# Patient Record
Sex: MALE | Employment: FULL TIME | URBAN - METROPOLITAN AREA
[De-identification: names, ages, dates, MRNs, and addresses within clinical notes are randomized per-mention and may not be internally consistent; named-entity substitution may affect disease eponyms.]

---

## 2019-02-06 ENCOUNTER — OFFICE VISIT (OUTPATIENT)
Dept: INTERNAL MEDICINE CLINIC | Facility: CLINIC | Age: 44
End: 2019-02-06

## 2019-02-06 VITALS
TEMPERATURE: 98 F | BODY MASS INDEX: 38.89 KG/M2 | HEART RATE: 68 BPM | DIASTOLIC BLOOD PRESSURE: 84 MMHG | HEIGHT: 67 IN | WEIGHT: 247.81 LBS | RESPIRATION RATE: 20 BRPM | SYSTOLIC BLOOD PRESSURE: 134 MMHG

## 2019-02-06 DIAGNOSIS — E66.9 OBESITY (BMI 30-39.9): ICD-10-CM

## 2019-02-06 DIAGNOSIS — R06.83 SNORING: ICD-10-CM

## 2019-02-06 DIAGNOSIS — R53.83 OTHER FATIGUE: ICD-10-CM

## 2019-02-06 DIAGNOSIS — Z00.00 PHYSICAL EXAM: Primary | ICD-10-CM

## 2019-02-06 DIAGNOSIS — Z00.00 PE (PHYSICAL EXAM), ROUTINE: ICD-10-CM

## 2019-02-06 PROCEDURE — 99213 OFFICE O/P EST LOW 20 MIN: CPT | Performed by: INTERNAL MEDICINE

## 2019-02-06 PROCEDURE — 99212 OFFICE O/P EST SF 10 MIN: CPT | Performed by: INTERNAL MEDICINE

## 2019-02-06 PROCEDURE — 99386 PREV VISIT NEW AGE 40-64: CPT | Performed by: INTERNAL MEDICINE

## 2019-02-06 NOTE — PROGRESS NOTES
HPI:    Patient ID: Derek Veronica is a 39year old male.   Patient presents with:  Physical: Pt is presenting today as a new pt and for a physical    Patient presents today for physical exam, states doing well otherwise , denies chest pain, shortness of file.  Allergies:No Known Allergies   PHYSICAL EXAM:   Physical Exam   Constitutional: He is oriented to person, place, and time. He appears well-developed and well-nourished. No distress. Obese    HENT:   Head: Normocephalic and atraumatic.    Right Ear: tolerated   Complete labs as ordered, preventative health maintenance testing discussed,   Immunizations discussed with patient   Patient verbalized understanding of all instructions and plan of care      Other fatigue /loud Snoring obesity  Pt  jessi miller

## 2019-03-07 ENCOUNTER — LAB ENCOUNTER (OUTPATIENT)
Dept: LAB | Age: 44
End: 2019-03-07
Attending: INTERNAL MEDICINE
Payer: COMMERCIAL

## 2019-03-07 DIAGNOSIS — Z00.00 PHYSICAL EXAM: ICD-10-CM

## 2019-03-07 LAB
ALBUMIN SERPL-MCNC: 3.9 G/DL (ref 3.4–5)
ALBUMIN/GLOB SERPL: 1 {RATIO} (ref 1–2)
ALP LIVER SERPL-CCNC: 56 U/L (ref 45–117)
ALT SERPL-CCNC: 36 U/L (ref 16–61)
ANION GAP SERPL CALC-SCNC: 6 MMOL/L (ref 0–18)
AST SERPL-CCNC: 21 U/L (ref 15–37)
BASOPHILS # BLD AUTO: 0.03 X10(3) UL (ref 0–0.2)
BASOPHILS NFR BLD AUTO: 0.7 %
BILIRUB SERPL-MCNC: 0.9 MG/DL (ref 0.1–2)
BILIRUB UR QL: NEGATIVE
BUN BLD-MCNC: 9 MG/DL (ref 7–18)
BUN/CREAT SERPL: 10.2 (ref 10–20)
CALCIUM BLD-MCNC: 9 MG/DL (ref 8.5–10.1)
CHLORIDE SERPL-SCNC: 102 MMOL/L (ref 98–107)
CHOLEST SMN-MCNC: 238 MG/DL (ref ?–200)
CLARITY UR: CLEAR
CO2 SERPL-SCNC: 30 MMOL/L (ref 21–32)
COLOR UR: YELLOW
COMPLEXED PSA SERPL-MCNC: 0.29 NG/ML (ref ?–4)
CREAT BLD-MCNC: 0.88 MG/DL (ref 0.7–1.3)
DEPRECATED RDW RBC AUTO: 43.1 FL (ref 35.1–46.3)
EOSINOPHIL # BLD AUTO: 0.13 X10(3) UL (ref 0–0.7)
EOSINOPHIL NFR BLD AUTO: 3.1 %
ERYTHROCYTE [DISTWIDTH] IN BLOOD BY AUTOMATED COUNT: 14.2 % (ref 11–15)
GLOBULIN PLAS-MCNC: 4 G/DL (ref 2.8–4.4)
GLUCOSE BLD-MCNC: 104 MG/DL (ref 70–99)
GLUCOSE UR-MCNC: NEGATIVE MG/DL
HCT VFR BLD AUTO: 48.5 % (ref 39–53)
HDLC SERPL-MCNC: 47 MG/DL (ref 40–59)
HGB BLD-MCNC: 15.1 G/DL (ref 13–17.5)
HGB UR QL STRIP.AUTO: NEGATIVE
IMM GRANULOCYTES # BLD AUTO: 0 X10(3) UL (ref 0–1)
IMM GRANULOCYTES NFR BLD: 0 %
KETONES UR-MCNC: NEGATIVE MG/DL
LDLC SERPL CALC-MCNC: 168 MG/DL (ref ?–100)
LEUKOCYTE ESTERASE UR QL STRIP.AUTO: NEGATIVE
LYMPHOCYTES # BLD AUTO: 2.26 X10(3) UL (ref 1–4)
LYMPHOCYTES NFR BLD AUTO: 54.7 %
M PROTEIN MFR SERPL ELPH: 7.9 G/DL (ref 6.4–8.2)
MCH RBC QN AUTO: 26.3 PG (ref 26–34)
MCHC RBC AUTO-ENTMCNC: 31.1 G/DL (ref 31–37)
MCV RBC AUTO: 84.5 FL (ref 80–100)
MONOCYTES # BLD AUTO: 0.41 X10(3) UL (ref 0.1–1)
MONOCYTES NFR BLD AUTO: 9.9 %
NEUTROPHILS # BLD AUTO: 1.3 X10 (3) UL (ref 1.5–7.7)
NEUTROPHILS # BLD AUTO: 1.3 X10(3) UL (ref 1.5–7.7)
NEUTROPHILS NFR BLD AUTO: 31.6 %
NITRITE UR QL STRIP.AUTO: NEGATIVE
NONHDLC SERPL-MCNC: 191 MG/DL (ref ?–130)
OSMOLALITY SERPL CALC.SUM OF ELEC: 285 MOSM/KG (ref 275–295)
PH UR: 6 [PH] (ref 5–8)
PLATELET # BLD AUTO: 223 10(3)UL (ref 150–450)
POTASSIUM SERPL-SCNC: 4.6 MMOL/L (ref 3.5–5.1)
PROT UR-MCNC: NEGATIVE MG/DL
RBC # BLD AUTO: 5.74 X10(6)UL (ref 4.3–5.7)
SODIUM SERPL-SCNC: 138 MMOL/L (ref 136–145)
SP GR UR STRIP: 1.02 (ref 1–1.03)
TRIGL SERPL-MCNC: 114 MG/DL (ref 30–149)
TSI SER-ACNC: 1.36 MIU/ML (ref 0.36–3.74)
UROBILINOGEN UR STRIP-ACNC: <2
VIT C UR-MCNC: NEGATIVE MG/DL
VLDLC SERPL CALC-MCNC: 23 MG/DL (ref 0–30)
WBC # BLD AUTO: 4.1 X10(3) UL (ref 4–11)

## 2019-03-07 PROCEDURE — 36415 COLL VENOUS BLD VENIPUNCTURE: CPT

## 2019-03-07 PROCEDURE — 84443 ASSAY THYROID STIM HORMONE: CPT

## 2019-03-07 PROCEDURE — 81003 URINALYSIS AUTO W/O SCOPE: CPT

## 2019-03-07 PROCEDURE — 80053 COMPREHEN METABOLIC PANEL: CPT

## 2019-03-07 PROCEDURE — 80061 LIPID PANEL: CPT

## 2019-03-07 PROCEDURE — 85025 COMPLETE CBC W/AUTO DIFF WBC: CPT

## 2019-03-11 NOTE — PROGRESS NOTES
Please call patient with blood test results.     Kidney and liver function are normal, no anemia.   minimally   Decreased neutrophils  - if any fever  Or  Chills pt  needs to be  Seen -     Cholesterol is elevated LDL- advise low  Saturated fat diet ,avoid

## 2020-07-22 ENCOUNTER — TELEPHONE (OUTPATIENT)
Dept: INTERNAL MEDICINE CLINIC | Facility: CLINIC | Age: 45
End: 2020-07-22

## 2020-07-22 NOTE — TELEPHONE ENCOUNTER
I recommend to have  a virtual visit for patient  Within 2  Days    I will Place letter -  Need more info -  Can schedule tomorrow - virtual  Visit

## 2020-07-22 NOTE — TELEPHONE ENCOUNTER
Virtual visit scheduled tomorrow 7/23/20  for 11:00 am with Dr. Renae Harada. Patient advised that there may be a co-pay involved in this type of visit.      Patient agreed to proceed, they understand the provider may be calling from a blocked, or unknown ph

## 2020-07-22 NOTE — TELEPHONE ENCOUNTER
Pt stated that his daughter went to get tested on Monday for covid-19 and she received a call today that she has tested postive for covid-19. Pt has no s/sx. Pt was advised of the protocol for [place].  Pt was advised that he has to Jovanni Perez notify the dispatch personnel that you have or may have COVID-19.   6. Cover your cough and sneezes.    7. Wash your hands often with soap and water for at least 20 seconds or clean your hands with an alcoholbased hand  that contains at least 60% a as resolution of fever without the use of fever-reducing medications; and  • Improvement in respiratory symptoms (e.g., cough, shortness of breath); and  • At least 10 days have passed since symptoms first appeared OR if asymptomatic patient or date of sym

## 2020-07-22 NOTE — TELEPHONE ENCOUNTER
Patient calling and he states that his daughter did get her results back this morning and she tested positive for COVID 19 and he states he spoke with a nurse today and she told him to quarantine for 14 days he need a letter for his job stating he need to

## 2020-07-23 ENCOUNTER — VIRTUAL PHONE E/M (OUTPATIENT)
Dept: INTERNAL MEDICINE CLINIC | Facility: CLINIC | Age: 45
End: 2020-07-23

## 2020-07-23 DIAGNOSIS — Z20.822 CLOSE EXPOSURE TO COVID-19 VIRUS: Primary | ICD-10-CM

## 2020-07-23 PROCEDURE — 99213 OFFICE O/P EST LOW 20 MIN: CPT | Performed by: INTERNAL MEDICINE

## 2020-07-23 NOTE — PROGRESS NOTES
Telemedicine Note    Virtual/Telephone Check-In    Sobia Ramirez verbally {consents to/declines:8330} a Virtual/Telephone Check-In service on 07/23/20.  Patient understands and accepts financial responsibility for any deductible, co-insurance and/or co- nausea,vomiting, diarrhea or constipation  : negative for burning with urination, frequency with urination   MUSCULOSKELETAL: negative for body  aches   NEURO: negative for headache and dizziness  PSYCHE: negative for depression or anxiety symptoms    AL

## 2020-07-23 NOTE — PROGRESS NOTES
Telemedicine Note    Virtual/Telephone Check-In    Sophie Leperanjith verbally consents to a Virtual/Telephone Check-In service on 07/23/20.  Patient understands and accepts financial responsibility for any deductible, co-insurance and/or co-pays associated Grandmother    • Diabetes Other    •    Patient Active Problem List:     Other fatigue     Obesity (BMI 30-39. 9)    No current outpatient medications on file.      No Known Allergies        ROS   GENERAL: feels well ,otherwise negative for fever or chills of breath or feeling very sick -patient to go to ER  Follow Up: 1 week      Duration of service, 16 in minutes:         Patient advised to follow CDC guidelines for self isolation and symptomatic treatment as outlined on CDC Patient Guidelines.      Explain

## 2020-07-27 ENCOUNTER — TELEPHONE (OUTPATIENT)
Dept: INTERNAL MEDICINE CLINIC | Facility: CLINIC | Age: 45
End: 2020-07-27

## 2020-07-27 NOTE — TELEPHONE ENCOUNTER
Patient states he's informing Dr. Elías Driver that he still has no COVID like symptoms, but asking to speak directly with Dr. Elías Driver. Please advise.

## 2020-07-28 NOTE — TELEPHONE ENCOUNTER
Pt needed to complete testing - in centers   For asymptomatic pt for  covid 19 - like [employer] . .. as recommended to patient   did he complete testing  ?  If not   I recommend pt to  complete   covid 19 Testing     Please provide to patient places  Where

## 2020-07-28 NOTE — TELEPHONE ENCOUNTER
Patient was following up to let Dr Alphonso Scott know that he did go yesterday for Covid test, results will come back in 7-10 days, is not currently having any symptoms. He will call back to notify of his results.

## 2020-08-03 ENCOUNTER — TELEPHONE (OUTPATIENT)
Dept: INTERNAL MEDICINE CLINIC | Facility: CLINIC | Age: 45
End: 2020-08-03

## 2020-08-03 NOTE — TELEPHONE ENCOUNTER
Called and spoke to patient, he stated he did get COVID testing done but he is waiting for his results. He has self-quarantined for 14 days since last seeing his daughter, and has no symptoms.     Please advise if ok for note

## 2020-08-03 NOTE — TELEPHONE ENCOUNTER
Patient calling and states daughter Frankie Feeling covid and he have quarantine for 14 day no symptoms now he need a letter to go back work he is expected to go back Wednesday August 5  He work for [employer]:  Will Diane    Please advise   H

## 2020-08-03 NOTE — TELEPHONE ENCOUNTER
Patient stated he got tested on July 27th at [facility] in [facility]. Relayed message below, patient will call with results/fax when he has them.

## 2020-08-03 NOTE — TELEPHONE ENCOUNTER
-   Did  He  got tested for COVID-19 in places  For  asymptomatic people ? Even though patient is asymptomatic he could still do the testing at places  For population and let me know . Note   Is approved  .

## 2020-08-03 NOTE — TELEPHONE ENCOUNTER
When  Was test done  ?     Will  Wait until  Tests  Back , pt to  Call me when  Results are  Back  - asap

## 2020-08-05 NOTE — TELEPHONE ENCOUNTER
Did patient get the test results on COVID-19 - if he did not its ok .     If patient has asymptomatic and had  2 weeks past from being in quarantine  -patient can go back to work tomorrow      Can generate letter patient can go back to work  Tomorrow -[employer]

## 2020-08-05 NOTE — TELEPHONE ENCOUNTER
Tried calling the patient x 2 on only listed number. No answer. Voicemail box is not set up yet. No mychart. Will try contacting at a later time.

## 2020-08-10 NOTE — TELEPHONE ENCOUNTER
Work note written. Pt states will call on Wednesday to provide us with fax number. States is due back at work on Thursday.     Please fax work excuse note from 8/10-20

## 2020-08-10 NOTE — TELEPHONE ENCOUNTER
Pt  Can go  Back to   Work  Tomorrow -  If no symptoms  Of  covid  19  -         Please   Generate  Letter for pt to go back to  Work  [employer]

## 2020-08-10 NOTE — TELEPHONE ENCOUNTER
Patient called and advised he DID get Covid Tested through [facility] in [facility]. He advised his Covid test came back negative. Patient also advised he has been in quarantine for 23 days now      Please Advise.

## 2020-08-12 NOTE — TELEPHONE ENCOUNTER
Patient phoned back with the fax number to fax the letter to. Please, address to [facility] at fax: [phone]. Please, call pt at [phone] with any questions. Please, fax today if possible.

## 2020-08-14 NOTE — TELEPHONE ENCOUNTER
Patient called back, stating his employer misplaced the note. He is requesting to have the note refaxed. ATTN Will Lucas Waldrop at fax [phone].

## 2020-08-14 NOTE — TELEPHONE ENCOUNTER
Letter re-faxed to contact info provided below. Tried calling the patient. No answer. VM box is not set up. Unable to leave message.

## 2020-08-14 NOTE — TELEPHONE ENCOUNTER
Patient is calling to follow up and states he needs letter from when he stayed home from work 07/22/2020 to be refaxed to his employer.

## 2020-08-14 NOTE — TELEPHONE ENCOUNTER
Tried calling the patient again to inform him letter has been refaxed per his request. No answer, no VM option.

## 2021-04-09 ENCOUNTER — TELEMEDICINE (OUTPATIENT)
Dept: INTERNAL MEDICINE CLINIC | Facility: CLINIC | Age: 46
End: 2021-04-09

## 2021-04-09 DIAGNOSIS — U07.1 COVID-19 VIRUS DETECTED: Primary | ICD-10-CM

## 2021-04-09 PROCEDURE — 99213 OFFICE O/P EST LOW 20 MIN: CPT | Performed by: INTERNAL MEDICINE

## 2021-04-09 NOTE — PROGRESS NOTES
Telehealth Visit        I spoke with Elias Brandt by secure video chat ([employer]), verified date of birth, and discussed their current concerns:     Reason for Visit:    Upper Respiratory Infection   This is a new problem.  The current episo not jaundiced. Neurological:      General: No focal deficit present. Mental Status: He is alert and oriented to person, place, and time. Mental status is at baseline. Psychiatric:         Mood and Affect: Mood normal.         Thought Content:  Thou missing    Medical History    Reviewed Active Problems:  Patient Active Problem List    COVID-19 virus detected            PCR positive 4/8/21      Other fatigue      Obesity (BMI 30-39. 9)       Reviewed Social History:  Social History    Tobacco Use detailed in the plan of care above. Coding/billing information is submitted for this visit based on complexity of care and/or time spent for the visit.     Joseluis Lloyd MD  Internal Medicine    ----------------------------------------- PATIENT INSTRUCTION

## 2021-04-12 ENCOUNTER — TELEPHONE (OUTPATIENT)
Dept: INTERNAL MEDICINE CLINIC | Facility: CLINIC | Age: 46
End: 2021-04-12

## 2021-04-12 NOTE — TELEPHONE ENCOUNTER
Patient called to update Dr. Emory Kimbrough per his request. Patient is in hospital now with Covid and pneumonia. Patient also needs a note faxed over to his job about his condition.  Fax: [phone]

## 2021-04-21 ENCOUNTER — TELEMEDICINE (OUTPATIENT)
Dept: INTERNAL MEDICINE CLINIC | Facility: CLINIC | Age: 46
End: 2021-04-21

## 2021-04-21 DIAGNOSIS — J12.82 PNEUMONIA DUE TO COVID-19 VIRUS: ICD-10-CM

## 2021-04-21 DIAGNOSIS — Z09 HOSPITAL DISCHARGE FOLLOW-UP: Primary | ICD-10-CM

## 2021-04-21 DIAGNOSIS — U07.1 PNEUMONIA DUE TO COVID-19 VIRUS: ICD-10-CM

## 2021-04-21 PROCEDURE — 99213 OFFICE O/P EST LOW 20 MIN: CPT | Performed by: INTERNAL MEDICINE

## 2021-04-21 NOTE — PROGRESS NOTES
Telehealth Visit        I spoke with Kiki Vela by secure video chat ([employer]), verified date of birth, and discussed their current concerns:     Reason for Visit:    HPI   74-year-old gentleman who tested positive for Covid on 4/8/2021 Conjunctiva/sclera: Conjunctivae normal.   Pulmonary:      Effort: Pulmonary effort is normal. No respiratory distress. Musculoskeletal:      Cervical back: Normal range of motion and neck supple. Skin:     Coloration: Skin is not jaundiced.    Ruth .0 03/07/2019     Lab Results   Component Value Date    CHOLEST 238 (H) 03/07/2019    TRIG 114 03/07/2019    HDL 47 03/07/2019     (H) 03/07/2019    VLDL 23 03/07/2019    NONHDLC 191 (H) 03/07/2019     The ASCVD Risk score (Ginger Cardenas., et the [facility] website. The patient verbally agreed to telehealth consent form, related consents and the risks discussed. Lastly, the patient confirmed that they were in [place].  Included in this visit, time may have been spent reviewing labs, medications, radi

## 2021-05-27 ENCOUNTER — OFFICE VISIT (OUTPATIENT)
Dept: INTERNAL MEDICINE CLINIC | Facility: CLINIC | Age: 46
End: 2021-05-27

## 2021-05-27 VITALS
HEIGHT: 69 IN | BODY MASS INDEX: 38.27 KG/M2 | SYSTOLIC BLOOD PRESSURE: 123 MMHG | WEIGHT: 258.38 LBS | TEMPERATURE: 97 F | DIASTOLIC BLOOD PRESSURE: 77 MMHG | HEART RATE: 87 BPM

## 2021-05-27 DIAGNOSIS — Z00.00 ANNUAL PHYSICAL EXAM: Primary | ICD-10-CM

## 2021-05-27 PROCEDURE — 3074F SYST BP LT 130 MM HG: CPT | Performed by: INTERNAL MEDICINE

## 2021-05-27 PROCEDURE — 3078F DIAST BP <80 MM HG: CPT | Performed by: INTERNAL MEDICINE

## 2021-05-27 PROCEDURE — 99396 PREV VISIT EST AGE 40-64: CPT | Performed by: INTERNAL MEDICINE

## 2021-05-27 PROCEDURE — 3008F BODY MASS INDEX DOCD: CPT | Performed by: INTERNAL MEDICINE

## 2021-05-27 RX ORDER — LORATADINE 10 MG
500 TABLET ORAL DAILY
COMMUNITY
Start: 2021-04-13

## 2021-05-27 NOTE — PROGRESS NOTES
History of Present Illness   Patient ID: Anitra Lyon is a 54year old male. Chief Complaint: Physical      Anitra Lyon is a pleasant 54year old male who presents for annual physical exam. Anitra Lyon is doing well today.       [address] General: Normal range of motion. Cervical back: Normal range of motion and neck supple. Skin:     General: Skin is warm. Neurological:      General: No focal deficit present.       Mental Status: He is alert and oriented to person, place, a detected            PCR positive 4/8/21      Other fatigue      Obesity (BMI 30-39. 9)       Reviewed:  History reviewed. No pertinent past medical history.    Reviewed:  Family History   Problem Relation Age of Onset   • Diabetes Paternal Grandmother    • D questions. Seek emergency care if necessary. Patient understands and agrees to follow directions and advice.       ----------------------------------------- PATIENT INSTRUCTIONS-----------------------------------------     There are no Patient Instruction

## 2022-07-24 ENCOUNTER — HOSPITAL ENCOUNTER (OUTPATIENT)
Age: 47
Discharge: HOME | End: 2022-07-24
Payer: COMMERCIAL

## 2022-07-24 ENCOUNTER — APPOINTMENT (OUTPATIENT)
Dept: GENERAL RADIOLOGY | Age: 47
End: 2022-07-24
Attending: PHYSICIAN ASSISTANT
Payer: COMMERCIAL

## 2022-07-24 VITALS
OXYGEN SATURATION: 99 % | RESPIRATION RATE: 18 BRPM | DIASTOLIC BLOOD PRESSURE: 82 MMHG | SYSTOLIC BLOOD PRESSURE: 131 MMHG | HEART RATE: 83 BPM | TEMPERATURE: 98 F

## 2022-07-24 DIAGNOSIS — M54.50 ACUTE MIDLINE LOW BACK PAIN WITHOUT SCIATICA: Primary | ICD-10-CM

## 2022-07-24 DIAGNOSIS — S39.012A STRAIN OF LUMBAR REGION, INITIAL ENCOUNTER: ICD-10-CM

## 2022-07-24 PROCEDURE — 72100 X-RAY EXAM L-S SPINE 2/3 VWS: CPT | Performed by: PHYSICIAN ASSISTANT

## 2022-07-24 PROCEDURE — 99203 OFFICE O/P NEW LOW 30 MIN: CPT | Performed by: PHYSICIAN ASSISTANT

## 2022-07-24 RX ORDER — METHYLPREDNISOLONE 4 MG/1
TABLET ORAL
Qty: 21 TABLET | Refills: 0 | Status: SHIPPED | OUTPATIENT
Start: 2022-07-24

## 2022-07-24 RX ORDER — IBUPROFEN 600 MG/1
TABLET ORAL
Qty: 20 TABLET | Refills: 0 | Status: SHIPPED | OUTPATIENT
Start: 2022-07-24

## 2022-07-24 RX ORDER — LIDOCAINE 50 MG/G
1 PATCH TOPICAL EVERY 24 HOURS
Qty: 5 PATCH | Refills: 0 | Status: SHIPPED | OUTPATIENT
Start: 2022-07-24 | End: 2022-07-29

## 2022-07-24 RX ORDER — CYCLOBENZAPRINE HCL 10 MG
10 TABLET ORAL 3 TIMES DAILY PRN
Qty: 14 TABLET | Refills: 0 | Status: SHIPPED | OUTPATIENT
Start: 2022-07-24 | End: 2022-07-31

## 2022-07-24 NOTE — ED INITIAL ASSESSMENT (HPI)
Patient presents a&o 4/4 with complaints of lower back pain since yesterday. States dog pulled on leash and patient twisted his back.  Painful when getting up from seated position

## 2022-07-27 ENCOUNTER — TELEPHONE (OUTPATIENT)
Dept: PHYSICAL MEDICINE AND REHAB | Facility: CLINIC | Age: 47
End: 2022-07-27

## 2022-08-23 ENCOUNTER — OFFICE VISIT (OUTPATIENT)
Dept: INTERNAL MEDICINE CLINIC | Facility: CLINIC | Age: 47
End: 2022-08-23
Payer: COMMERCIAL

## 2022-08-23 VITALS
DIASTOLIC BLOOD PRESSURE: 90 MMHG | HEIGHT: 69 IN | SYSTOLIC BLOOD PRESSURE: 135 MMHG | HEART RATE: 88 BPM | BODY MASS INDEX: 38.66 KG/M2 | WEIGHT: 261 LBS

## 2022-08-23 DIAGNOSIS — M43.06 LUMBAR SPONDYLOLYSIS: Primary | ICD-10-CM

## 2022-08-23 PROCEDURE — 3075F SYST BP GE 130 - 139MM HG: CPT | Performed by: INTERNAL MEDICINE

## 2022-08-23 PROCEDURE — 99214 OFFICE O/P EST MOD 30 MIN: CPT | Performed by: INTERNAL MEDICINE

## 2022-08-23 PROCEDURE — 3008F BODY MASS INDEX DOCD: CPT | Performed by: INTERNAL MEDICINE

## 2022-08-23 PROCEDURE — 3080F DIAST BP >= 90 MM HG: CPT | Performed by: INTERNAL MEDICINE

## 2022-08-23 RX ORDER — METHYLPREDNISOLONE 4 MG/1
TABLET ORAL
Qty: 1 EACH | Refills: 0 | Status: SHIPPED | OUTPATIENT
Start: 2022-08-23

## 2022-08-23 RX ORDER — CYCLOBENZAPRINE HCL 5 MG
TABLET ORAL 3 TIMES DAILY PRN
Qty: 90 TABLET | Refills: 1 | Status: SHIPPED | OUTPATIENT
Start: 2022-08-23

## 2022-09-15 ENCOUNTER — TELEPHONE (OUTPATIENT)
Dept: INTERNAL MEDICINE CLINIC | Facility: CLINIC | Age: 47
End: 2022-09-15

## 2022-09-20 NOTE — TELEPHONE ENCOUNTER
Relayed message to pt. Pt understood and states he will talk about it with Dr Kailee Latham more in depth at the time of his next appt. Informed pt to please call us to inform us if things change.

## 2022-09-20 NOTE — TELEPHONE ENCOUNTER
Dr. Breann Qureshi,    Pt is requesting intermittent FMLA due to his low back pain: 1-6 flare ups per month. Do you support?     Thank you,  Heath Ro

## 2022-09-20 NOTE — TELEPHONE ENCOUNTER
Recommend he discuss with physiatry in regards to his back pain and FMLA. Have only seen him for 1 episode of back pain, if physiatry is in agreements recommend getting a notes and other they can fill the FMLA or I can if it needs to be filled out by PCP.

## 2023-06-06 ENCOUNTER — TELEPHONE (OUTPATIENT)
Dept: INTERNAL MEDICINE CLINIC | Facility: CLINIC | Age: 48
End: 2023-06-06

## 2023-10-23 ENCOUNTER — TELEPHONE (OUTPATIENT)
Dept: INTERNAL MEDICINE CLINIC | Facility: CLINIC | Age: 48
End: 2023-10-23

## 2023-10-23 NOTE — TELEPHONE ENCOUNTER
Pt due for annual physical and preventative screenings.  If pt calls back please schedule an annual physical

## (undated) NOTE — LETTER
07/07/21         [address]      Dear Sarah Crespo,    Our records indicate that you have outstanding lab work and or testing that was ordered for you and has not yet been completed:  Orders Placed This Encounter

## (undated) NOTE — LETTER
4/9/2021          To Whom It May Concern:    Jasmin Greenberg is currently under my medical care and may not return to work due to COVID positive PCR testing. We will re-evaluate on 4/21/21.      If you require additional information please contact our off

## (undated) NOTE — LETTER
7/23/2020              [address] [id number]             To Whom it May Concern,   Amol Doll is currently under my medical care and may not return to work at this time.     Please excuse Nubia Barrera [address] [place] [id number]  [phone]        Document electronically generated by:  Kostas Pipero

## (undated) NOTE — LETTER
8/10/2020          To Whom It May Concern:    Chris Miguel is currently under my medical care and has been cleared to return to work. He may return to work on 08/13/20. Activity is restricted as follows: none.     If you require additional informatio

## (undated) NOTE — LETTER
4/21/2021          To Whom It May Concern:    Michelle Arciniega is currently under my medical care and may return to work on 4/26/21. No restrictions. If you require additional information please contact our office.         Sincerely,    Jolly Angel,